# Patient Record
Sex: FEMALE | Race: ASIAN | NOT HISPANIC OR LATINO
[De-identification: names, ages, dates, MRNs, and addresses within clinical notes are randomized per-mention and may not be internally consistent; named-entity substitution may affect disease eponyms.]

---

## 2019-07-18 ENCOUNTER — APPOINTMENT (OUTPATIENT)
Dept: OBGYN | Facility: CLINIC | Age: 44
End: 2019-07-18
Payer: COMMERCIAL

## 2019-07-18 PROCEDURE — 99203 OFFICE O/P NEW LOW 30 MIN: CPT

## 2019-07-25 ENCOUNTER — FORM ENCOUNTER (OUTPATIENT)
Age: 44
End: 2019-07-25

## 2019-09-09 ENCOUNTER — APPOINTMENT (OUTPATIENT)
Dept: INTERNAL MEDICINE | Facility: CLINIC | Age: 44
End: 2019-09-09
Payer: COMMERCIAL

## 2019-09-09 ENCOUNTER — NON-APPOINTMENT (OUTPATIENT)
Age: 44
End: 2019-09-09

## 2019-09-09 VITALS
OXYGEN SATURATION: 98 % | SYSTOLIC BLOOD PRESSURE: 110 MMHG | HEIGHT: 62 IN | WEIGHT: 105 LBS | DIASTOLIC BLOOD PRESSURE: 70 MMHG | BODY MASS INDEX: 19.32 KG/M2 | HEART RATE: 75 BPM | TEMPERATURE: 98.6 F

## 2019-09-09 DIAGNOSIS — Z98.890 OTHER SPECIFIED POSTPROCEDURAL STATES: ICD-10-CM

## 2019-09-09 DIAGNOSIS — Z78.9 OTHER SPECIFIED HEALTH STATUS: ICD-10-CM

## 2019-09-09 DIAGNOSIS — Z80.42 FAMILY HISTORY OF MALIGNANT NEOPLASM OF PROSTATE: ICD-10-CM

## 2019-09-09 DIAGNOSIS — Z82.49 FAMILY HISTORY OF ISCHEMIC HEART DISEASE AND OTHER DISEASES OF THE CIRCULATORY SYSTEM: ICD-10-CM

## 2019-09-09 DIAGNOSIS — Z01.818 ENCOUNTER FOR OTHER PREPROCEDURAL EXAMINATION: ICD-10-CM

## 2019-09-09 DIAGNOSIS — Z87.19 OTHER SPECIFIED POSTPROCEDURAL STATES: ICD-10-CM

## 2019-09-09 DIAGNOSIS — D25.9 LEIOMYOMA OF UTERUS, UNSPECIFIED: ICD-10-CM

## 2019-09-09 DIAGNOSIS — Z80.3 FAMILY HISTORY OF MALIGNANT NEOPLASM OF BREAST: ICD-10-CM

## 2019-09-09 DIAGNOSIS — Z86.018 PERSONAL HISTORY OF OTHER BENIGN NEOPLASM: ICD-10-CM

## 2019-09-09 DIAGNOSIS — Z92.89 PERSONAL HISTORY OF OTHER MEDICAL TREATMENT: ICD-10-CM

## 2019-09-09 PROCEDURE — 93000 ELECTROCARDIOGRAM COMPLETE: CPT

## 2019-09-09 PROCEDURE — 99203 OFFICE O/P NEW LOW 30 MIN: CPT | Mod: 25

## 2019-09-09 PROCEDURE — 36415 COLL VENOUS BLD VENIPUNCTURE: CPT

## 2019-09-10 LAB
ABO + RH PNL BLD: NORMAL
ALBUMIN SERPL ELPH-MCNC: 4.3 G/DL
ALP BLD-CCNC: 41 U/L
ALT SERPL-CCNC: 12 U/L
ANION GAP SERPL CALC-SCNC: 14 MMOL/L
APPEARANCE: CLEAR
APTT BLD: 29.6 SEC
AST SERPL-CCNC: 18 U/L
BASOPHILS # BLD AUTO: 0.03 K/UL
BASOPHILS NFR BLD AUTO: 0.9 %
BILIRUB SERPL-MCNC: 0.2 MG/DL
BILIRUBIN URINE: NEGATIVE
BLOOD URINE: NORMAL
BUN SERPL-MCNC: 12 MG/DL
CALCIUM SERPL-MCNC: 9.7 MG/DL
CHLORIDE SERPL-SCNC: 104 MMOL/L
CO2 SERPL-SCNC: 22 MMOL/L
COLOR: COLORLESS
CREAT SERPL-MCNC: 0.67 MG/DL
EOSINOPHIL # BLD AUTO: 0.02 K/UL
EOSINOPHIL NFR BLD AUTO: 0.6 %
GLUCOSE QUALITATIVE U: NEGATIVE
GLUCOSE SERPL-MCNC: 96 MG/DL
HCT VFR BLD CALC: 43.6 %
HGB BLD-MCNC: 14.3 G/DL
IMM GRANULOCYTES NFR BLD AUTO: 0.3 %
INR PPP: 0.87 RATIO
KETONES URINE: NEGATIVE
LEUKOCYTE ESTERASE URINE: NEGATIVE
LYMPHOCYTES # BLD AUTO: 1.11 K/UL
LYMPHOCYTES NFR BLD AUTO: 31.6 %
MAN DIFF?: NORMAL
MCHC RBC-ENTMCNC: 32.6 PG
MCHC RBC-ENTMCNC: 32.8 GM/DL
MCV RBC AUTO: 99.3 FL
MONOCYTES # BLD AUTO: 0.29 K/UL
MONOCYTES NFR BLD AUTO: 8.3 %
NEUTROPHILS # BLD AUTO: 2.05 K/UL
NEUTROPHILS NFR BLD AUTO: 58.3 %
NITRITE URINE: NEGATIVE
PH URINE: 7
PLATELET # BLD AUTO: 286 K/UL
POTASSIUM SERPL-SCNC: 4.6 MMOL/L
PROT SERPL-MCNC: 7.2 G/DL
PROTEIN URINE: NEGATIVE
PT BLD: 9.9 SEC
RBC # BLD: 4.39 M/UL
RBC # FLD: 13 %
SODIUM SERPL-SCNC: 140 MMOL/L
SPECIFIC GRAVITY URINE: 1.01
UROBILINOGEN URINE: NORMAL
WBC # FLD AUTO: 3.51 K/UL

## 2019-09-10 NOTE — ASSESSMENT
[Patient Optimized for Surgery] : Patient optimized for surgery [FreeTextEntry4] : LOW risk for INTERMEDIATE risk surgery. May proceed w/ elective surgery.  [No Further Testing Recommended] : no further testing recommended

## 2019-09-10 NOTE — HISTORY OF PRESENT ILLNESS
[No Pertinent Cardiac History] : no history of aortic stenosis, atrial fibrillation, coronary artery disease, recent myocardial infarction, or implantable device/pacemaker [No Pertinent Pulmonary History] : no history of asthma, COPD, sleep apnea, or smoking [No Adverse Anesthesia Reaction] : no adverse anesthesia reaction in self or family member [Chronic Kidney Disease] : no chronic kidney disease [Chronic Anticoagulation] : no chronic anticoagulation [Diabetes] : no diabetes [Excellent (>10 METs)] : Excellent (>10 METs) [FreeTextEntry1] : Abdominal myomectomy D and C diagnostic hysteroscopy tubal lavage [FreeTextEntry2] : 9/20/19 [FreeTextEntry3] : Dr. Saavedra [FreeTextEntry4] : Fibroid uterus causing discomfort from mass effects.

## 2019-09-11 LAB — HCG UR QL: NEGATIVE

## 2019-09-19 VITALS
WEIGHT: 106.7 LBS | OXYGEN SATURATION: 100 % | HEART RATE: 73 BPM | DIASTOLIC BLOOD PRESSURE: 62 MMHG | SYSTOLIC BLOOD PRESSURE: 98 MMHG | RESPIRATION RATE: 16 BRPM | HEIGHT: 62 IN | TEMPERATURE: 97 F

## 2019-09-20 ENCOUNTER — RESULT REVIEW (OUTPATIENT)
Age: 44
End: 2019-09-20

## 2019-09-20 ENCOUNTER — APPOINTMENT (OUTPATIENT)
Dept: OBGYN | Facility: HOSPITAL | Age: 44
End: 2019-09-20

## 2019-09-20 ENCOUNTER — INPATIENT (INPATIENT)
Facility: HOSPITAL | Age: 44
LOS: 2 days | Discharge: HOME | DRG: 743 | End: 2019-09-23
Attending: OBSTETRICS & GYNECOLOGY | Admitting: OBSTETRICS & GYNECOLOGY
Payer: COMMERCIAL

## 2019-09-20 DIAGNOSIS — Z41.9 ENCOUNTER FOR PROCEDURE FOR PURPOSES OTHER THAN REMEDYING HEALTH STATE, UNSPECIFIED: Chronic | ICD-10-CM

## 2019-09-20 DIAGNOSIS — Z98.890 OTHER SPECIFIED POSTPROCEDURAL STATES: Chronic | ICD-10-CM

## 2019-09-20 LAB
BLD GP AB SCN SERPL QL: NEGATIVE — SIGNIFICANT CHANGE UP
RH IG SCN BLD-IMP: POSITIVE — SIGNIFICANT CHANGE UP

## 2019-09-20 PROCEDURE — 58140 MYOMECTOMY ABDOM METHOD: CPT

## 2019-09-20 PROCEDURE — 58140 MYOMECTOMY ABDOM METHOD: CPT | Mod: 80

## 2019-09-20 PROCEDURE — 58555 HYSTEROSCOPY DX SEP PROC: CPT

## 2019-09-20 PROCEDURE — 58555 HYSTEROSCOPY DX SEP PROC: CPT | Mod: 80

## 2019-09-20 RX ORDER — METOCLOPRAMIDE HCL 10 MG
10 TABLET ORAL EVERY 6 HOURS
Refills: 0 | Status: DISCONTINUED | OUTPATIENT
Start: 2019-09-20 | End: 2019-09-23

## 2019-09-20 RX ORDER — DOCUSATE SODIUM 100 MG
100 CAPSULE ORAL
Refills: 0 | Status: DISCONTINUED | OUTPATIENT
Start: 2019-09-20 | End: 2019-09-23

## 2019-09-20 RX ORDER — ONDANSETRON 8 MG/1
4 TABLET, FILM COATED ORAL EVERY 6 HOURS
Refills: 0 | Status: DISCONTINUED | OUTPATIENT
Start: 2019-09-20 | End: 2019-09-23

## 2019-09-20 RX ORDER — ACETAMINOPHEN 500 MG
1000 TABLET ORAL EVERY 8 HOURS
Refills: 0 | Status: DISCONTINUED | OUTPATIENT
Start: 2019-09-20 | End: 2019-09-23

## 2019-09-20 RX ORDER — HEPARIN SODIUM 5000 [USP'U]/ML
5000 INJECTION INTRAVENOUS; SUBCUTANEOUS ONCE
Refills: 0 | Status: COMPLETED | OUTPATIENT
Start: 2019-09-20 | End: 2019-09-20

## 2019-09-20 RX ORDER — KETOROLAC TROMETHAMINE 30 MG/ML
30 SYRINGE (ML) INJECTION EVERY 6 HOURS
Refills: 0 | Status: DISCONTINUED | OUTPATIENT
Start: 2019-09-20 | End: 2019-09-20

## 2019-09-20 RX ORDER — ENOXAPARIN SODIUM 100 MG/ML
40 INJECTION SUBCUTANEOUS EVERY 24 HOURS
Refills: 0 | Status: DISCONTINUED | OUTPATIENT
Start: 2019-09-21 | End: 2019-09-23

## 2019-09-20 RX ORDER — SCOPALAMINE 1 MG/3D
1 PATCH, EXTENDED RELEASE TRANSDERMAL ONCE
Refills: 0 | Status: COMPLETED | OUTPATIENT
Start: 2019-09-20 | End: 2019-09-20

## 2019-09-20 RX ORDER — OXYCODONE HYDROCHLORIDE 5 MG/1
10 TABLET ORAL EVERY 6 HOURS
Refills: 0 | Status: DISCONTINUED | OUTPATIENT
Start: 2019-09-20 | End: 2019-09-23

## 2019-09-20 RX ORDER — HYDROMORPHONE HYDROCHLORIDE 2 MG/ML
0.5 INJECTION INTRAMUSCULAR; INTRAVENOUS; SUBCUTANEOUS
Refills: 0 | Status: DISCONTINUED | OUTPATIENT
Start: 2019-09-20 | End: 2019-09-23

## 2019-09-20 RX ORDER — SIMETHICONE 80 MG/1
80 TABLET, CHEWABLE ORAL THREE TIMES A DAY
Refills: 0 | Status: DISCONTINUED | OUTPATIENT
Start: 2019-09-20 | End: 2019-09-23

## 2019-09-20 RX ORDER — OXYCODONE HYDROCHLORIDE 5 MG/1
5 TABLET ORAL EVERY 4 HOURS
Refills: 0 | Status: DISCONTINUED | OUTPATIENT
Start: 2019-09-20 | End: 2019-09-23

## 2019-09-20 RX ORDER — PHENAZOPYRIDINE HCL 100 MG
200 TABLET ORAL EVERY 8 HOURS
Refills: 0 | Status: DISCONTINUED | OUTPATIENT
Start: 2019-09-20 | End: 2019-09-23

## 2019-09-20 RX ORDER — SODIUM CHLORIDE 9 MG/ML
1000 INJECTION, SOLUTION INTRAVENOUS
Refills: 0 | Status: DISCONTINUED | OUTPATIENT
Start: 2019-09-20 | End: 2019-09-21

## 2019-09-20 RX ADMIN — Medication 100 MILLIGRAM(S): at 18:04

## 2019-09-20 RX ADMIN — SCOPALAMINE 1 PATCH: 1 PATCH, EXTENDED RELEASE TRANSDERMAL at 18:05

## 2019-09-20 RX ADMIN — HYDROMORPHONE HYDROCHLORIDE 0.5 MILLIGRAM(S): 2 INJECTION INTRAMUSCULAR; INTRAVENOUS; SUBCUTANEOUS at 18:03

## 2019-09-20 RX ADMIN — HYDROMORPHONE HYDROCHLORIDE 0.5 MILLIGRAM(S): 2 INJECTION INTRAMUSCULAR; INTRAVENOUS; SUBCUTANEOUS at 18:13

## 2019-09-20 RX ADMIN — SODIUM CHLORIDE 90 MILLILITER(S): 9 INJECTION, SOLUTION INTRAVENOUS at 18:08

## 2019-09-20 RX ADMIN — SIMETHICONE 80 MILLIGRAM(S): 80 TABLET, CHEWABLE ORAL at 22:30

## 2019-09-20 RX ADMIN — Medication 30 MILLIGRAM(S): at 22:30

## 2019-09-20 RX ADMIN — Medication 200 MILLIGRAM(S): at 22:31

## 2019-09-20 RX ADMIN — Medication 1000 MILLIGRAM(S): at 23:30

## 2019-09-20 RX ADMIN — Medication 30 MILLIGRAM(S): at 22:55

## 2019-09-20 RX ADMIN — HEPARIN SODIUM 5000 UNIT(S): 5000 INJECTION INTRAVENOUS; SUBCUTANEOUS at 18:03

## 2019-09-20 RX ADMIN — SCOPALAMINE 1 PATCH: 1 PATCH, EXTENDED RELEASE TRANSDERMAL at 18:43

## 2019-09-20 RX ADMIN — Medication 1000 MILLIGRAM(S): at 22:30

## 2019-09-20 NOTE — H&P ADULT - HISTORY OF PRESENT ILLNESS
43yo G0 presenting for scheduled myomectomy with possible hysteroscopy, D&C, tubal lavage. Pt states that she has a 12cm fibroid last imaged in March that has been causing her to experience pelvic pressure, urinary frequency, and bulk symptoms. She denies heavy vaginal bleeding. Pt is actively trying to get pregnant and follows with infertility specialist. Feels well today.     OBHx: denies  GYNHx: LEEP 2012; recent colpo negative; regular menses with normal flow;   PMH: denies  PSH: hx failed hysteroscopy last year c/b cervical stenosis; egg retrieval x1  Meds: OCPs  All: NKDA

## 2019-09-20 NOTE — BRIEF OPERATIVE NOTE - NSICDXBRIEFPROCEDURE_GEN_ALL_CORE_FT
PROCEDURES:  Open left ovarian cystectomy 20-Sep-2019 18:00:59  Kelly Carias  Chromotubation, oviduct 20-Sep-2019 17:58:15  Kelly Carias  Myomectomy, uterus, 5 or more leiomyomata, abdominal approach, total weight greater than 250 grams 20-Sep-2019 17:57:51  Kelly Carias  Hysteroscopy 20-Sep-2019 17:57:37  Kelly Carias

## 2019-09-20 NOTE — BRIEF OPERATIVE NOTE - OPERATION/FINDINGS
Enlarged fibroid uterus to 22 weeks, multiple fibroids  Normal uterine cavity on hysteroscopy   Bilateral efflux of dye from fallopian tubes indicating patency  Normal ovaries with simple appearing right cyst Enlarged fibroid uterus to 22 weeks, multiple fibroids  Normal uterine cavity on hysteroscopy   Bilateral efflux of dye from fallopian tubes indicating patency  Normal ovaries with simple appearing left ovarian cyst

## 2019-09-20 NOTE — H&P ADULT - ASSESSMENT
45yo G0 presenting for scheduled abdominal myomectomy, possible hysteroscopy, D&C and tubal lavage  -NPO  -IVF  -cross 2u PRBC

## 2019-09-20 NOTE — PACU DISCHARGE NOTE - COMMENTS
Patient meets criteria for PACU discharge. A&Ox4, VSS, dressing to abdomen clean, dry and intact, Patient drinking water and eating crackers with no nausea.  at bedside, Patient moving all extremities and shifting herself in bed, No SOB, No chest pain, # 18 gauge IV to left arm intact and patent receiving LRS @ 90ml/hr, #16 F Ballard draining yellow/clear urine>170ml output since 17:47-Now 2105PM, Report given to Claudine on 5 Wol[place], patient transported via bed with 2 CNAs

## 2019-09-21 LAB
BASOPHILS # BLD AUTO: 0.02 K/UL — SIGNIFICANT CHANGE UP (ref 0–0.2)
BASOPHILS NFR BLD AUTO: 0.2 % — SIGNIFICANT CHANGE UP (ref 0–2)
EOSINOPHIL # BLD AUTO: 0 K/UL — SIGNIFICANT CHANGE UP (ref 0–0.5)
EOSINOPHIL NFR BLD AUTO: 0 % — SIGNIFICANT CHANGE UP (ref 0–6)
HCT VFR BLD CALC: 33 % — LOW (ref 34.5–45)
HCT VFR BLD CALC: 33.2 % — LOW (ref 34.5–45)
HGB BLD-MCNC: 11 G/DL — LOW (ref 11.5–15.5)
HGB BLD-MCNC: 11.2 G/DL — LOW (ref 11.5–15.5)
IMM GRANULOCYTES NFR BLD AUTO: 0.5 % — SIGNIFICANT CHANGE UP (ref 0–1.5)
LYMPHOCYTES # BLD AUTO: 0.85 K/UL — LOW (ref 1–3.3)
LYMPHOCYTES # BLD AUTO: 8.5 % — LOW (ref 13–44)
MCHC RBC-ENTMCNC: 32.5 PG — SIGNIFICANT CHANGE UP (ref 27–34)
MCHC RBC-ENTMCNC: 32.6 PG — SIGNIFICANT CHANGE UP (ref 27–34)
MCHC RBC-ENTMCNC: 33.1 GM/DL — SIGNIFICANT CHANGE UP (ref 32–36)
MCHC RBC-ENTMCNC: 33.9 GM/DL — SIGNIFICANT CHANGE UP (ref 32–36)
MCV RBC AUTO: 95.9 FL — SIGNIFICANT CHANGE UP (ref 80–100)
MCV RBC AUTO: 98.2 FL — SIGNIFICANT CHANGE UP (ref 80–100)
MONOCYTES # BLD AUTO: 0.7 K/UL — SIGNIFICANT CHANGE UP (ref 0–0.9)
MONOCYTES NFR BLD AUTO: 7 % — SIGNIFICANT CHANGE UP (ref 2–14)
NEUTROPHILS # BLD AUTO: 8.37 K/UL — HIGH (ref 1.8–7.4)
NEUTROPHILS NFR BLD AUTO: 83.8 % — HIGH (ref 43–77)
NRBC # BLD: 0 /100 WBCS — SIGNIFICANT CHANGE UP (ref 0–0)
NRBC # BLD: 0 /100 WBCS — SIGNIFICANT CHANGE UP (ref 0–0)
PLATELET # BLD AUTO: 196 K/UL — SIGNIFICANT CHANGE UP (ref 150–400)
PLATELET # BLD AUTO: 196 K/UL — SIGNIFICANT CHANGE UP (ref 150–400)
RBC # BLD: 3.38 M/UL — LOW (ref 3.8–5.2)
RBC # BLD: 3.44 M/UL — LOW (ref 3.8–5.2)
RBC # FLD: 12.4 % — SIGNIFICANT CHANGE UP (ref 10.3–14.5)
RBC # FLD: 12.6 % — SIGNIFICANT CHANGE UP (ref 10.3–14.5)
WBC # BLD: 8.86 K/UL — SIGNIFICANT CHANGE UP (ref 3.8–10.5)
WBC # BLD: 9.99 K/UL — SIGNIFICANT CHANGE UP (ref 3.8–10.5)
WBC # FLD AUTO: 8.86 K/UL — SIGNIFICANT CHANGE UP (ref 3.8–10.5)
WBC # FLD AUTO: 9.99 K/UL — SIGNIFICANT CHANGE UP (ref 3.8–10.5)

## 2019-09-21 RX ORDER — SODIUM CHLORIDE 9 MG/ML
1000 INJECTION, SOLUTION INTRAVENOUS
Refills: 0 | Status: DISCONTINUED | OUTPATIENT
Start: 2019-09-21 | End: 2019-09-22

## 2019-09-21 RX ORDER — SODIUM CHLORIDE 9 MG/ML
3 INJECTION INTRAMUSCULAR; INTRAVENOUS; SUBCUTANEOUS EVERY 8 HOURS
Refills: 0 | Status: DISCONTINUED | OUTPATIENT
Start: 2019-09-21 | End: 2019-09-23

## 2019-09-21 RX ORDER — SODIUM CHLORIDE 9 MG/ML
500 INJECTION, SOLUTION INTRAVENOUS
Refills: 0 | Status: COMPLETED | OUTPATIENT
Start: 2019-09-21 | End: 2019-09-21

## 2019-09-21 RX ADMIN — ONDANSETRON 4 MILLIGRAM(S): 8 TABLET, FILM COATED ORAL at 09:30

## 2019-09-21 RX ADMIN — Medication 200 MILLIGRAM(S): at 12:10

## 2019-09-21 RX ADMIN — OXYCODONE HYDROCHLORIDE 5 MILLIGRAM(S): 5 TABLET ORAL at 13:31

## 2019-09-21 RX ADMIN — ENOXAPARIN SODIUM 40 MILLIGRAM(S): 100 INJECTION SUBCUTANEOUS at 05:36

## 2019-09-21 RX ADMIN — Medication 500 MILLIGRAM(S): at 21:20

## 2019-09-21 RX ADMIN — SCOPALAMINE 1 PATCH: 1 PATCH, EXTENDED RELEASE TRANSDERMAL at 05:37

## 2019-09-21 RX ADMIN — OXYCODONE HYDROCHLORIDE 5 MILLIGRAM(S): 5 TABLET ORAL at 00:11

## 2019-09-21 RX ADMIN — OXYCODONE HYDROCHLORIDE 5 MILLIGRAM(S): 5 TABLET ORAL at 01:00

## 2019-09-21 RX ADMIN — SIMETHICONE 80 MILLIGRAM(S): 80 TABLET, CHEWABLE ORAL at 05:36

## 2019-09-21 RX ADMIN — OXYCODONE HYDROCHLORIDE 5 MILLIGRAM(S): 5 TABLET ORAL at 14:37

## 2019-09-21 RX ADMIN — OXYCODONE HYDROCHLORIDE 5 MILLIGRAM(S): 5 TABLET ORAL at 18:17

## 2019-09-21 RX ADMIN — Medication 1000 MILLIGRAM(S): at 22:04

## 2019-09-21 RX ADMIN — Medication 200 MILLIGRAM(S): at 05:36

## 2019-09-21 RX ADMIN — Medication 1000 MILLIGRAM(S): at 13:49

## 2019-09-21 RX ADMIN — OXYCODONE HYDROCHLORIDE 10 MILLIGRAM(S): 5 TABLET ORAL at 04:15

## 2019-09-21 RX ADMIN — OXYCODONE HYDROCHLORIDE 10 MILLIGRAM(S): 5 TABLET ORAL at 22:49

## 2019-09-21 RX ADMIN — SIMETHICONE 80 MILLIGRAM(S): 80 TABLET, CHEWABLE ORAL at 12:10

## 2019-09-21 RX ADMIN — SODIUM CHLORIDE 3 MILLILITER(S): 9 INJECTION INTRAMUSCULAR; INTRAVENOUS; SUBCUTANEOUS at 22:05

## 2019-09-21 RX ADMIN — Medication 1000 MILLIGRAM(S): at 12:10

## 2019-09-21 RX ADMIN — SODIUM CHLORIDE 90 MILLILITER(S): 9 INJECTION, SOLUTION INTRAVENOUS at 16:36

## 2019-09-21 RX ADMIN — Medication 100 MILLIGRAM(S): at 05:36

## 2019-09-21 RX ADMIN — SODIUM CHLORIDE 1000 MILLILITER(S): 9 INJECTION, SOLUTION INTRAVENOUS at 09:34

## 2019-09-21 RX ADMIN — OXYCODONE HYDROCHLORIDE 5 MILLIGRAM(S): 5 TABLET ORAL at 20:01

## 2019-09-21 RX ADMIN — OXYCODONE HYDROCHLORIDE 10 MILLIGRAM(S): 5 TABLET ORAL at 05:02

## 2019-09-21 RX ADMIN — Medication 500 MILLIGRAM(S): at 09:30

## 2019-09-21 RX ADMIN — Medication 1000 MILLIGRAM(S): at 23:00

## 2019-09-21 RX ADMIN — OXYCODONE HYDROCHLORIDE 10 MILLIGRAM(S): 5 TABLET ORAL at 23:35

## 2019-09-21 RX ADMIN — Medication 200 MILLIGRAM(S): at 22:04

## 2019-09-21 RX ADMIN — Medication 1000 MILLIGRAM(S): at 06:20

## 2019-09-21 RX ADMIN — Medication 500 MILLIGRAM(S): at 10:20

## 2019-09-21 RX ADMIN — SIMETHICONE 80 MILLIGRAM(S): 80 TABLET, CHEWABLE ORAL at 22:04

## 2019-09-21 RX ADMIN — SODIUM CHLORIDE 3 MILLILITER(S): 9 INJECTION INTRAMUSCULAR; INTRAVENOUS; SUBCUTANEOUS at 12:04

## 2019-09-21 RX ADMIN — SCOPALAMINE 1 PATCH: 1 PATCH, EXTENDED RELEASE TRANSDERMAL at 18:18

## 2019-09-21 RX ADMIN — Medication 1000 MILLIGRAM(S): at 05:36

## 2019-09-21 RX ADMIN — Medication 100 MILLIGRAM(S): at 18:17

## 2019-09-21 RX ADMIN — Medication 500 MILLIGRAM(S): at 20:32

## 2019-09-21 NOTE — PROGRESS NOTE ADULT - ASSESSMENT
A/P: 45yo s/p abdominal myomectomy, diagnostic hysteroscopy, and chromopertubation for a 22 week size uterus, now POD1  1. Neuro/Pain:  torodal, tylenol,  2  CV:   VS per routine, Heplock this AM , hemodynamically stable  3. Pulm: Encourage ISS  4. GI: Reg, no nausea/vomiting, passing flatus  5. :  ashby to be removed this am  6. Heme: f/u am labs  7. ID: --  8. DVT ppx: SCDs  9. Dispo: when meeting all post-op milestones

## 2019-09-21 NOTE — PROGRESS NOTE ADULT - SUBJECTIVE AND OBJECTIVE BOX
GYN Progress Note    Patient seen at bedside. Pain well controlled. Tolerating clears. Ambulating, passing flatus. Ballard to be removed this morning. Denies CP, palpitations, SOB, fever, chills, nausea, vomiting.  Denies CP, palpitations, SOB, fever, chills, nausea, vomiting.    Vital Signs Last 24 Hrs  T(C): 37.6 (21 Sep 2019 05:59), Max: 37.6 (21 Sep 2019 01:14)  T(F): 99.6 (21 Sep 2019 05:59), Max: 99.6 (21 Sep 2019 01:14)  HR: 74 (21 Sep 2019 05:59) (68 - 82)  BP: 92/54 (21 Sep 2019 05:59) (92/54 - 109/67)  BP(mean): 72 (20 Sep 2019 20:51) (65 - 80)  RR: 18 (21 Sep 2019 05:59) (14 - 18)  SpO2: 95% (21 Sep 2019 05:59) (95% - 100%)    Physical Exam:  Gen: No Acute Distress  Pulm: Normal work of breathing  GI: soft, appropriately tender, nondistended, +BS, no rebound, no guarding.  Incision C/D/I  Ext: SCDs in place, wnl    I&O's Summary    20 Sep 2019 07:01  -  21 Sep 2019 06:14  --------------------------------------------------------  IN: 990 mL / OUT: 2120 mL / NET: -1130 mL      MEDICATIONS  (STANDING):  acetaminophen   Tablet .. 1000 milliGRAM(s) Oral every 8 hours  docusate sodium 100 milliGRAM(s) Oral two times a day  enoxaparin Injectable 40 milliGRAM(s) SubCutaneous every 24 hours  lactated ringers. 1000 milliLiter(s) (90 mL/Hr) IV Continuous <Continuous>  naproxen 500 milliGRAM(s) Oral every 12 hours  phenazopyridine 200 milliGRAM(s) Oral every 8 hours  simethicone 80 milliGRAM(s) Chew three times a day    MEDICATIONS  (PRN):  HYDROmorphone  Injectable 0.5 milliGRAM(s) IV Push every 10 minutes PRN Severe Pain (7 - 10)  metoclopramide Injectable 10 milliGRAM(s) IV Push every 6 hours PRN Nausea and/or Vomiting first line  ondansetron Injectable 4 milliGRAM(s) IV Push every 6 hours PRN Nausea and/or Vomiting  oxyCODONE    IR 5 milliGRAM(s) Oral every 4 hours PRN Moderate Pain (4 - 6)  oxyCODONE    IR 10 milliGRAM(s) Oral every 6 hours PRN Severe Pain (7 - 10)      LABS:

## 2019-09-21 NOTE — PROGRESS NOTE ADULT - ASSESSMENT
A/P: 43yo s/p abdominal myomectomy, diagnostic hysteroscopy, and chromopertubation for a 22 week size uterus, now POD1  1. Neuro/Pain:  torodal, tylenol  2  CV:   VS per routine, continue fluids @ 90 cc/hr until patient eating regularly   3. Pulm: Encourage ISS  4. GI: Reg, no nausea/vomiting, passing flatus  5. :  voiding  6. Heme: ordering stat cbc now  7. ID: --  8. DVT ppx: SCDs  9. Dispo: when meeting all post-op milestones

## 2019-09-21 NOTE — PROGRESS NOTE ADULT - SUBJECTIVE AND OBJECTIVE BOX
GYN POC    Pt seen and examined at bedside. Pt complains of mild abdominal pain, likely due to gas. Just got oxycodone for pain, will try to ambulate now.  Pt denies any fever, chills, chest pain, SOB, nausea or vomiting     T(F): 99.6 (09-21-19 @ 01:14), Max: 99.6 (09-21-19 @ 01:14)  HR: 82 (09-21-19 @ 01:14) (68 - 82)  BP: 97/47 (09-21-19 @ 01:14) (95/55 - 109/67)  RR: 18 (09-21-19 @ 01:14) (14 - 18)  SpO2: 97% (09-21-19 @ 01:14) (95% - 100%)  Wt(kg): --    09-20 @ 07:01  -  09-21 @ 02:10  --------------------------------------------------------  IN: 540 mL / OUT: 1320 mL / NET: -780 mL        acetaminophen   Tablet .. 1000 milliGRAM(s) Oral every 8 hours  docusate sodium 100 milliGRAM(s) Oral two times a day  enoxaparin Injectable 40 milliGRAM(s) SubCutaneous every 24 hours  HYDROmorphone  Injectable 0.5 milliGRAM(s) IV Push every 10 minutes PRN Severe Pain (7 - 10)  lactated ringers. 1000 milliLiter(s) IV Continuous <Continuous>  metoclopramide Injectable 10 milliGRAM(s) IV Push every 6 hours PRN Nausea and/or Vomiting first line  naproxen 500 milliGRAM(s) Oral every 12 hours  ondansetron Injectable 4 milliGRAM(s) IV Push every 6 hours PRN Nausea and/or Vomiting  oxyCODONE    IR 5 milliGRAM(s) Oral every 4 hours PRN Moderate Pain (4 - 6)  oxyCODONE    IR 10 milliGRAM(s) Oral every 6 hours PRN Severe Pain (7 - 10)  phenazopyridine 200 milliGRAM(s) Oral every 8 hours  simethicone 80 milliGRAM(s) Chew three times a day      Physical exam:  Constitutional: NAD  Pulmonary: normal respiratory effort  Abdomen: pressure dressing in place  Extremities: no lower extremity edema, or calve tenderness. SCDs in place

## 2019-09-21 NOTE — PROGRESS NOTE ADULT - ASSESSMENT
A: 43yo s/p abdominal myomectomy, diagnostic hysteroscopy, and chromopertubation for a 22 week size uterus, now POD1  Plan:  1. Vital signs stable, continue to monitor per protocol  2. Pain control: tylenol/toradol atc  3. DVT prophylaxis: SCDs  4. CV: IVH   5. Pulm: Incentive spirometer (at least 10 times per hour while awake)   6. GI: reg  7. : Ballard   8. Follow up labs: AM CBC  9. Activity: bedrest tonight

## 2019-09-21 NOTE — PROGRESS NOTE ADULT - SUBJECTIVE AND OBJECTIVE BOX
Patient evaluated at bedside. She has no complaints. She reports pain is well controlled with medications. She denies HA, dizziness, SOB, CP, palpitations, n/v. Says dizziness has resolved, has gotten up out of bed multiple times to urinate and doesn't feel lightheaded or dizzy.     T(C): 37.6 (09-21-19 @ 05:59), Max: 37.6 (09-21-19 @ 05:59)  HR: 78 (09-21-19 @ 13:30) (56 - 82)  BP: 93/53 (09-21-19 @ 13:30) (64/40 - 96/91)  RR: 18 (09-21-19 @ 13:30) (17 - 19)  SpO2: 95% (09-21-19 @ 13:30) (95% - 98%)    GA:  Resp: normal respiratory effort  Abd: +BS, soft, NT/ND, no rebound or guarding, uterus firm at umbilicus, higher than this AM's exam, but fundus firm and rest of abdomen soft   Extrem: SCDs in place, no LE edema       09-20 @ 07:01  -  09-21 @ 07:00  --------------------------------------------------------  IN: 990 mL / OUT: 2120 mL / NET: -1130 mL    09-21 @ 07:01  -  09-21 @ 15:59  --------------------------------------------------------  IN: 0 mL / OUT: 2200 mL / NET: -2200 mL                              11.2   9.99  )-----------( 196      ( 21 Sep 2019 06:07 )             33.0

## 2019-09-21 NOTE — PROGRESS NOTE ADULT - ATTENDING COMMENTS
Pt doing well abd soft appropriately tender and soft mildly distended. Ambulating well without symptoms. voiding spont. tolerating regular diet.  To repeat cbc in am

## 2019-09-22 ENCOUNTER — TRANSCRIPTION ENCOUNTER (OUTPATIENT)
Age: 44
End: 2019-09-22

## 2019-09-22 LAB
HCT VFR BLD CALC: 32.1 % — LOW (ref 34.5–45)
HGB BLD-MCNC: 10.3 G/DL — LOW (ref 11.5–15.5)
MCHC RBC-ENTMCNC: 32 PG — SIGNIFICANT CHANGE UP (ref 27–34)
MCHC RBC-ENTMCNC: 32.1 GM/DL — SIGNIFICANT CHANGE UP (ref 32–36)
MCV RBC AUTO: 99.7 FL — SIGNIFICANT CHANGE UP (ref 80–100)
NRBC # BLD: 0 /100 WBCS — SIGNIFICANT CHANGE UP (ref 0–0)
PLATELET # BLD AUTO: 177 K/UL — SIGNIFICANT CHANGE UP (ref 150–400)
RBC # BLD: 3.22 M/UL — LOW (ref 3.8–5.2)
RBC # FLD: 12.6 % — SIGNIFICANT CHANGE UP (ref 10.3–14.5)
WBC # BLD: 7.63 K/UL — SIGNIFICANT CHANGE UP (ref 3.8–10.5)
WBC # FLD AUTO: 7.63 K/UL — SIGNIFICANT CHANGE UP (ref 3.8–10.5)

## 2019-09-22 RX ORDER — OXYCODONE HYDROCHLORIDE 5 MG/1
1 TABLET ORAL
Qty: 8 | Refills: 0
Start: 2019-09-22 | End: 2019-09-23

## 2019-09-22 RX ADMIN — Medication 200 MILLIGRAM(S): at 14:00

## 2019-09-22 RX ADMIN — SIMETHICONE 80 MILLIGRAM(S): 80 TABLET, CHEWABLE ORAL at 21:04

## 2019-09-22 RX ADMIN — Medication 1000 MILLIGRAM(S): at 05:40

## 2019-09-22 RX ADMIN — Medication 100 MILLIGRAM(S): at 05:40

## 2019-09-22 RX ADMIN — Medication 1000 MILLIGRAM(S): at 21:05

## 2019-09-22 RX ADMIN — Medication 1000 MILLIGRAM(S): at 15:11

## 2019-09-22 RX ADMIN — Medication 200 MILLIGRAM(S): at 21:04

## 2019-09-22 RX ADMIN — SIMETHICONE 80 MILLIGRAM(S): 80 TABLET, CHEWABLE ORAL at 14:01

## 2019-09-22 RX ADMIN — Medication 1000 MILLIGRAM(S): at 22:05

## 2019-09-22 RX ADMIN — Medication 1000 MILLIGRAM(S): at 06:30

## 2019-09-22 RX ADMIN — Medication 100 MILLIGRAM(S): at 18:47

## 2019-09-22 RX ADMIN — SODIUM CHLORIDE 3 MILLILITER(S): 9 INJECTION INTRAMUSCULAR; INTRAVENOUS; SUBCUTANEOUS at 05:46

## 2019-09-22 RX ADMIN — SODIUM CHLORIDE 3 MILLILITER(S): 9 INJECTION INTRAMUSCULAR; INTRAVENOUS; SUBCUTANEOUS at 14:01

## 2019-09-22 RX ADMIN — Medication 200 MILLIGRAM(S): at 05:39

## 2019-09-22 RX ADMIN — OXYCODONE HYDROCHLORIDE 5 MILLIGRAM(S): 5 TABLET ORAL at 06:34

## 2019-09-22 RX ADMIN — OXYCODONE HYDROCHLORIDE 5 MILLIGRAM(S): 5 TABLET ORAL at 18:48

## 2019-09-22 RX ADMIN — SODIUM CHLORIDE 3 MILLILITER(S): 9 INJECTION INTRAMUSCULAR; INTRAVENOUS; SUBCUTANEOUS at 21:07

## 2019-09-22 RX ADMIN — SCOPALAMINE 1 PATCH: 1 PATCH, EXTENDED RELEASE TRANSDERMAL at 18:58

## 2019-09-22 RX ADMIN — OXYCODONE HYDROCHLORIDE 5 MILLIGRAM(S): 5 TABLET ORAL at 07:20

## 2019-09-22 RX ADMIN — Medication 500 MILLIGRAM(S): at 11:10

## 2019-09-22 RX ADMIN — Medication 500 MILLIGRAM(S): at 22:05

## 2019-09-22 RX ADMIN — SCOPALAMINE 1 PATCH: 1 PATCH, EXTENDED RELEASE TRANSDERMAL at 06:34

## 2019-09-22 RX ADMIN — ONDANSETRON 4 MILLIGRAM(S): 8 TABLET, FILM COATED ORAL at 14:00

## 2019-09-22 RX ADMIN — OXYCODONE HYDROCHLORIDE 5 MILLIGRAM(S): 5 TABLET ORAL at 20:00

## 2019-09-22 RX ADMIN — Medication 500 MILLIGRAM(S): at 12:00

## 2019-09-22 RX ADMIN — ENOXAPARIN SODIUM 40 MILLIGRAM(S): 100 INJECTION SUBCUTANEOUS at 05:38

## 2019-09-22 RX ADMIN — Medication 500 MILLIGRAM(S): at 21:05

## 2019-09-22 RX ADMIN — SIMETHICONE 80 MILLIGRAM(S): 80 TABLET, CHEWABLE ORAL at 05:40

## 2019-09-22 RX ADMIN — Medication 1000 MILLIGRAM(S): at 14:00

## 2019-09-22 NOTE — DISCHARGE NOTE PROVIDER - HOSPITAL COURSE
43yo G0 s/p abdominal myomectomy, diagnostic hysteroscopy, and chromopertubation for a fibroids/ 22 week size uterus. Her post-operative course was complicated by a presyncopal episode on POD1 after getting out of bed, likely due to a vasovagal episode which resolved with fluids. Post-op Hb has been stable 11.2>11.0>10.3. She is hemodynamically and clinically stable and meeting all post-operative milestones on POD2. She should follow up with her Gyn in 1-2 weeks.

## 2019-09-22 NOTE — DISCHARGE NOTE PROVIDER - CARE PROVIDER_API CALL
Sandro Saavedra)  Obstetrics and Gynecology  [address] [place] [address], [address]  [place] [address]  Phone: [phone]  Fax: [phone]  Follow Up Time:

## 2019-09-22 NOTE — PROGRESS NOTE ADULT - SUBJECTIVE AND OBJECTIVE BOX
Patient evaluated at bedside. She has no complaints. She reports pain is well controlled with medications. She denies HA, dizziness, SOB, CP, palpitations, n/v. Denies any episodes of dizziness since yesterday morning. Is now eating and drinking. Ambulated multiple times yesterday with no issue.     T(C): 36.9 (09-22-19 @ 05:31), Max: 37.2 (09-21-19 @ 22:04)  HR: 78 (09-22-19 @ 05:31) (68 - 78)  BP: 96/60 (09-22-19 @ 05:31) (96/60 - 102/67)  RR: 18 (09-22-19 @ 05:31) (18 - 18)  SpO2: 96% (09-22-19 @ 05:31) (96% - 96%)    GA:  Resp: normal respiratory effort  Abd: +BS, soft, NT/ND, no rebound or guarding, incisions CDI   Extrem: SCDs in place, no LE edema       09-21 @ 07:01  -  09-22 @ 07:00  --------------------------------------------------------  IN: 1850 mL / OUT: 4950 mL / NET: -3100 mL                              10.3   7.63  )-----------( 177      ( 22 Sep 2019 05:52 )             32.1

## 2019-09-22 NOTE — PROGRESS NOTE ADULT - ASSESSMENT
A/P: 43yo POD#2  s/p abdominal myomectomy, diagnostic hysteroscopy, and chromopertubation for a 22 week size uterus    1. Neuro/Pain:  torodal, tylenol  2  CV:   VS per routine  3. Pulm: Encourage ISS  4. GI: Reg, no nausea/vomiting, passing flatus  5. :  voiding  6. Heme: hgb stable   7. ID: --  8. DVT ppx: SCDs  9. Dispo: when meeting all post-op milestones

## 2019-09-22 NOTE — PROGRESS NOTE ADULT - ATTENDING COMMENTS
Pt seen with complaints of mild nausea no dizziness, +flatus and tolerating po.  VSS  cbc stable  Abd soft nt nd +bs incision clean and dry   ext nt neg homans  S/P abdominal myomectomy stable  repeat cbc in am prob d/c in am

## 2019-09-22 NOTE — DISCHARGE NOTE NURSING/CASE MANAGEMENT/SOCIAL WORK - PATIENT PORTAL LINK FT
You can access the FollowMyHealth Patient Portal offered by [facility] by registering at the following website: [email or web address]. By joining [employer]’s FollowMyHealth portal, you will also be able to view your health information using other applications (apps) compatible with our system.

## 2019-09-22 NOTE — DISCHARGE NOTE PROVIDER - NSDCFUSCHEDAPPT_GEN_ALL_CORE_FT
IMMANUEL MCALLISTER ; 10/03/2019 ; NPP OB/GYN  [address] [facility] IMMANUEL MCALLISTER ; 10/03/2019 ; NPP OB/GYN  [facility]

## 2019-09-22 NOTE — PROGRESS NOTE ADULT - SUBJECTIVE AND OBJECTIVE BOX
Patient evaluated at bedside. She had some nausea earlier which improved after a nap this afternoon and is feeling somewhat weak. She denies any further presyncopal or syncopal episodes. She will try to get up and walk now and eat some lunch. She reports pain is well controlled with medications. She denies HA, dizziness, SOB, CP, palpitations, n/v.    T(C): 36.7 (09-22-19 @ 11:55), Max: 36.9 (09-22-19 @ 05:31)  HR: 61 (09-22-19 @ 11:55) (61 - 78)  BP: 96/57 (09-22-19 @ 11:55) (96/57 - 96/60)  RR: 16 (09-22-19 @ 11:55) (16 - 18)  SpO2: 96% (09-22-19 @ 11:55) (96% - 96%)    GA:  Resp: normal respiratory effort  Abd: +BS, soft, NT/ND, no rebound or guarding  Extrem: SCDs in place, no LE edema       09-21 @ 07:01  -  09-22 @ 07:00  --------------------------------------------------------  IN: 1850 mL / OUT: 4950 mL / NET: -3100 mL    09-22 @ 07:01  -  09-22 @ 15:15  --------------------------------------------------------  IN: 0 mL / OUT: 500 mL / NET: -500 mL                              10.3   7.63  )-----------( 177      ( 22 Sep 2019 05:52 )             32.1

## 2019-09-23 VITALS
RESPIRATION RATE: 17 BRPM | HEART RATE: 71 BPM | DIASTOLIC BLOOD PRESSURE: 61 MMHG | OXYGEN SATURATION: 95 % | TEMPERATURE: 98 F | SYSTOLIC BLOOD PRESSURE: 95 MMHG

## 2019-09-23 LAB
HCT VFR BLD CALC: 34 % — LOW (ref 34.5–45)
HGB BLD-MCNC: 10.8 G/DL — LOW (ref 11.5–15.5)
MCHC RBC-ENTMCNC: 31.8 GM/DL — LOW (ref 32–36)
MCHC RBC-ENTMCNC: 32.1 PG — SIGNIFICANT CHANGE UP (ref 27–34)
MCV RBC AUTO: 101.2 FL — HIGH (ref 80–100)
NRBC # BLD: 0 /100 WBCS — SIGNIFICANT CHANGE UP (ref 0–0)
PLATELET # BLD AUTO: 197 K/UL — SIGNIFICANT CHANGE UP (ref 150–400)
RBC # BLD: 3.36 M/UL — LOW (ref 3.8–5.2)
RBC # FLD: 12.6 % — SIGNIFICANT CHANGE UP (ref 10.3–14.5)
WBC # BLD: 7.9 K/UL — SIGNIFICANT CHANGE UP (ref 3.8–10.5)
WBC # FLD AUTO: 7.9 K/UL — SIGNIFICANT CHANGE UP (ref 3.8–10.5)

## 2019-09-23 RX ORDER — METOCLOPRAMIDE HCL 10 MG
10 TABLET ORAL ONCE
Refills: 0 | Status: COMPLETED | OUTPATIENT
Start: 2019-09-23 | End: 2019-09-23

## 2019-09-23 RX ORDER — OXYCODONE HYDROCHLORIDE 5 MG/1
1 TABLET ORAL
Qty: 8 | Refills: 0
Start: 2019-09-23 | End: 2019-09-24

## 2019-09-23 RX ADMIN — OXYCODONE HYDROCHLORIDE 5 MILLIGRAM(S): 5 TABLET ORAL at 09:29

## 2019-09-23 RX ADMIN — SCOPALAMINE 1 PATCH: 1 PATCH, EXTENDED RELEASE TRANSDERMAL at 06:08

## 2019-09-23 RX ADMIN — Medication 100 MILLIGRAM(S): at 05:26

## 2019-09-23 RX ADMIN — Medication 500 MILLIGRAM(S): at 10:30

## 2019-09-23 RX ADMIN — OXYCODONE HYDROCHLORIDE 5 MILLIGRAM(S): 5 TABLET ORAL at 10:28

## 2019-09-23 RX ADMIN — SODIUM CHLORIDE 3 MILLILITER(S): 9 INJECTION INTRAMUSCULAR; INTRAVENOUS; SUBCUTANEOUS at 05:24

## 2019-09-23 RX ADMIN — ENOXAPARIN SODIUM 40 MILLIGRAM(S): 100 INJECTION SUBCUTANEOUS at 05:26

## 2019-09-23 RX ADMIN — Medication 10 MILLIGRAM(S): at 11:26

## 2019-09-23 RX ADMIN — Medication 1000 MILLIGRAM(S): at 05:26

## 2019-09-23 RX ADMIN — Medication 1000 MILLIGRAM(S): at 06:26

## 2019-09-23 RX ADMIN — SIMETHICONE 80 MILLIGRAM(S): 80 TABLET, CHEWABLE ORAL at 05:26

## 2019-09-23 RX ADMIN — Medication 200 MILLIGRAM(S): at 05:26

## 2019-09-23 RX ADMIN — Medication 500 MILLIGRAM(S): at 09:30

## 2019-09-27 LAB — SURGICAL PATHOLOGY STUDY: SIGNIFICANT CHANGE UP

## 2019-10-02 DIAGNOSIS — D25.9 LEIOMYOMA OF UTERUS, UNSPECIFIED: ICD-10-CM

## 2019-10-02 DIAGNOSIS — N83.209 UNSPECIFIED OVARIAN CYST, UNSPECIFIED SIDE: ICD-10-CM

## 2019-10-03 ENCOUNTER — APPOINTMENT (OUTPATIENT)
Dept: OBGYN | Facility: CLINIC | Age: 44
End: 2019-10-03
Payer: COMMERCIAL

## 2019-10-03 PROBLEM — D25.9 LEIOMYOMA OF UTERUS, UNSPECIFIED: Chronic | Status: ACTIVE | Noted: 2019-09-19

## 2019-10-03 PROCEDURE — 99024 POSTOP FOLLOW-UP VISIT: CPT

## 2019-10-07 ENCOUNTER — FORM ENCOUNTER (OUTPATIENT)
Age: 44
End: 2019-10-07

## 2019-10-23 ENCOUNTER — FORM ENCOUNTER (OUTPATIENT)
Age: 44
End: 2019-10-23

## 2019-10-24 ENCOUNTER — FORM ENCOUNTER (OUTPATIENT)
Age: 44
End: 2019-10-24

## 2019-10-24 ENCOUNTER — APPOINTMENT (OUTPATIENT)
Dept: OBGYN | Facility: CLINIC | Age: 44
End: 2019-10-24
Payer: COMMERCIAL

## 2019-10-24 PROCEDURE — 99024 POSTOP FOLLOW-UP VISIT: CPT

## 2019-10-31 PROCEDURE — 85025 COMPLETE CBC W/AUTO DIFF WBC: CPT

## 2019-10-31 PROCEDURE — 86900 BLOOD TYPING SEROLOGIC ABO: CPT

## 2019-10-31 PROCEDURE — 36415 COLL VENOUS BLD VENIPUNCTURE: CPT

## 2019-10-31 PROCEDURE — 85027 COMPLETE CBC AUTOMATED: CPT

## 2019-10-31 PROCEDURE — 88305 TISSUE EXAM BY PATHOLOGIST: CPT

## 2019-10-31 PROCEDURE — 86901 BLOOD TYPING SEROLOGIC RH(D): CPT

## 2019-10-31 PROCEDURE — 86850 RBC ANTIBODY SCREEN: CPT

## 2021-08-12 ENCOUNTER — NON-APPOINTMENT (OUTPATIENT)
Age: 46
End: 2021-08-12

## 2021-08-13 ENCOUNTER — FORM ENCOUNTER (OUTPATIENT)
Age: 46
End: 2021-08-13

## 2021-08-14 ENCOUNTER — APPOINTMENT (OUTPATIENT)
Dept: OBGYN | Facility: CLINIC | Age: 46
End: 2021-08-14
Payer: MEDICAID

## 2021-08-14 ENCOUNTER — APPOINTMENT (OUTPATIENT)
Dept: OBGYN | Facility: CLINIC | Age: 46
End: 2021-08-14

## 2021-08-14 PROCEDURE — 99213 OFFICE O/P EST LOW 20 MIN: CPT | Mod: 95

## 2021-09-21 ENCOUNTER — FORM ENCOUNTER (OUTPATIENT)
Age: 46
End: 2021-09-21

## 2021-09-29 ENCOUNTER — OUTPATIENT (OUTPATIENT)
Dept: OUTPATIENT SERVICES | Facility: HOSPITAL | Age: 46
LOS: 1 days | End: 2021-09-29
Payer: COMMERCIAL

## 2021-09-29 VITALS
WEIGHT: 110.01 LBS | HEIGHT: 62 IN | DIASTOLIC BLOOD PRESSURE: 69 MMHG | OXYGEN SATURATION: 99 % | RESPIRATION RATE: 16 BRPM | SYSTOLIC BLOOD PRESSURE: 101 MMHG | HEART RATE: 63 BPM | TEMPERATURE: 98 F

## 2021-09-29 DIAGNOSIS — Z01.818 ENCOUNTER FOR OTHER PREPROCEDURAL EXAMINATION: ICD-10-CM

## 2021-09-29 DIAGNOSIS — Z98.890 OTHER SPECIFIED POSTPROCEDURAL STATES: Chronic | ICD-10-CM

## 2021-09-29 DIAGNOSIS — D25.1 INTRAMURAL LEIOMYOMA OF UTERUS: ICD-10-CM

## 2021-09-29 DIAGNOSIS — N84.0 POLYP OF CORPUS UTERI: ICD-10-CM

## 2021-09-29 DIAGNOSIS — Z41.9 ENCOUNTER FOR PROCEDURE FOR PURPOSES OTHER THAN REMEDYING HEALTH STATE, UNSPECIFIED: Chronic | ICD-10-CM

## 2021-09-29 DIAGNOSIS — D25.9 LEIOMYOMA OF UTERUS, UNSPECIFIED: ICD-10-CM

## 2021-09-29 LAB
BLD GP AB SCN SERPL QL: NEGATIVE — SIGNIFICANT CHANGE UP
HCT VFR BLD CALC: 38.5 % — SIGNIFICANT CHANGE UP (ref 34.5–45)
HGB BLD-MCNC: 13 G/DL — SIGNIFICANT CHANGE UP (ref 11.5–15.5)
MCHC RBC-ENTMCNC: 30.7 PG — SIGNIFICANT CHANGE UP (ref 27–34)
MCHC RBC-ENTMCNC: 33.8 GM/DL — SIGNIFICANT CHANGE UP (ref 32–36)
MCV RBC AUTO: 91 FL — SIGNIFICANT CHANGE UP (ref 80–100)
NRBC # BLD: 0 /100 WBCS — SIGNIFICANT CHANGE UP (ref 0–0)
PLATELET # BLD AUTO: 231 K/UL — SIGNIFICANT CHANGE UP (ref 150–400)
RBC # BLD: 4.23 M/UL — SIGNIFICANT CHANGE UP (ref 3.8–5.2)
RBC # FLD: 13.2 % — SIGNIFICANT CHANGE UP (ref 10.3–14.5)
RH IG SCN BLD-IMP: POSITIVE — SIGNIFICANT CHANGE UP
WBC # BLD: 4.2 K/UL — SIGNIFICANT CHANGE UP (ref 3.8–10.5)
WBC # FLD AUTO: 4.2 K/UL — SIGNIFICANT CHANGE UP (ref 3.8–10.5)

## 2021-09-29 PROCEDURE — 86850 RBC ANTIBODY SCREEN: CPT

## 2021-09-29 PROCEDURE — 85027 COMPLETE CBC AUTOMATED: CPT

## 2021-09-29 PROCEDURE — 86900 BLOOD TYPING SEROLOGIC ABO: CPT

## 2021-09-29 PROCEDURE — G0463: CPT

## 2021-09-29 PROCEDURE — 86901 BLOOD TYPING SEROLOGIC RH(D): CPT

## 2021-09-29 RX ORDER — LIDOCAINE HCL 20 MG/ML
0.2 VIAL (ML) INJECTION ONCE
Refills: 0 | Status: DISCONTINUED | OUTPATIENT
Start: 2021-10-01 | End: 2021-10-15

## 2021-09-29 RX ORDER — SODIUM CHLORIDE 9 MG/ML
3 INJECTION INTRAMUSCULAR; INTRAVENOUS; SUBCUTANEOUS EVERY 8 HOURS
Refills: 0 | Status: DISCONTINUED | OUTPATIENT
Start: 2021-10-01 | End: 2021-10-15

## 2021-09-29 NOTE — H&P PST ADULT - NSICDXPASTMEDICALHX_GEN_ALL_CORE_FT
PAST MEDICAL HISTORY:  Uterine fibroid      PAST MEDICAL HISTORY:  Uterine fibroid     Uterine leiomyoma

## 2021-09-29 NOTE — H&P PST ADULT - HISTORY OF PRESENT ILLNESS
IVF transfer,   LMP 21, , myomectomy , 2 miscarriage down syndrome d&C 2020. normal flow, and regular    45yo G0 presenting for scheduled myomectomy with possible hysteroscopy, D&C, tubal lavage. Pt states that she has a 12cm fibroid last imaged in March that has been causing her to experience pelvic pressure, urinary frequency, and bulk symptoms. She denies heavy vaginal bleeding. Pt is actively trying to get pregnant and follows with infertility specialist. Feels well today.     OBHx: denies  GYNHx: LEEP ; recent colpo negative; regular menses with normal flow;   PMH: denies  PSH: hx failed hysteroscopy last year c/b cervical stenosis; egg retrieval x1  Meds: OCPs  All: NKDA     45 yo female, LMP 21, , PMH abnormal PAP s/p  LEEP and normal Colpo, uterine fibroid s/p myomectomy 2019, pt. has regular menses with normal flow - getting prepared for IVF. U/S revealed a uterine polyp and presents to [facility] for scheduled D&C, Operative Hysteroscopy on 10/1/21. Pt. denies COVID-19 infection in , denies close contact with anyone that has been ill, no fever, cough, dyspnea in past two weeks. Pt.'s 2nd Moderna vaccine 2021    COVID-19 PCR NEGATIVE on 21 - result in chart

## 2021-09-29 NOTE — H&P PST ADULT - PROBLEM SELECTOR PLAN 1
D&C, non obstetrical operative Hysteroscopy on 10/1/121 D&C, non obstetrical operative Hysteroscopy on 10/1/21  Pre-op instructions provided - all questions answered  CBC and T&S done at [facility]  Uc DOS  Covid-19 negative on 9/28/21

## 2021-09-29 NOTE — H&P PST ADULT - NSICDXPASTSURGICALHX_GEN_ALL_CORE_FT
PAST SURGICAL HISTORY:  S/P hernia repair 2016 left inguinal    Surgery, elective 2012 [facility]     PAST SURGICAL HISTORY:  S/P hernia repair 2016 left inguinal    S/P myomectomy 2019    Surgery, elective 2012 [facility]

## 2021-09-30 ENCOUNTER — TRANSCRIPTION ENCOUNTER (OUTPATIENT)
Age: 46
End: 2021-09-30

## 2021-10-01 ENCOUNTER — OUTPATIENT (OUTPATIENT)
Dept: OUTPATIENT SERVICES | Facility: HOSPITAL | Age: 46
LOS: 1 days | End: 2021-10-01
Payer: COMMERCIAL

## 2021-10-01 ENCOUNTER — RESULT REVIEW (OUTPATIENT)
Age: 46
End: 2021-10-01

## 2021-10-01 VITALS
TEMPERATURE: 97 F | HEIGHT: 62 IN | SYSTOLIC BLOOD PRESSURE: 96 MMHG | RESPIRATION RATE: 16 BRPM | HEART RATE: 69 BPM | DIASTOLIC BLOOD PRESSURE: 66 MMHG | WEIGHT: 110.01 LBS | OXYGEN SATURATION: 99 %

## 2021-10-01 VITALS
RESPIRATION RATE: 18 BRPM | OXYGEN SATURATION: 100 % | TEMPERATURE: 98 F | HEART RATE: 61 BPM | DIASTOLIC BLOOD PRESSURE: 54 MMHG | SYSTOLIC BLOOD PRESSURE: 98 MMHG

## 2021-10-01 DIAGNOSIS — Z01.818 ENCOUNTER FOR OTHER PREPROCEDURAL EXAMINATION: ICD-10-CM

## 2021-10-01 DIAGNOSIS — D25.1 INTRAMURAL LEIOMYOMA OF UTERUS: ICD-10-CM

## 2021-10-01 DIAGNOSIS — Z98.890 OTHER SPECIFIED POSTPROCEDURAL STATES: Chronic | ICD-10-CM

## 2021-10-01 DIAGNOSIS — Z41.9 ENCOUNTER FOR PROCEDURE FOR PURPOSES OTHER THAN REMEDYING HEALTH STATE, UNSPECIFIED: Chronic | ICD-10-CM

## 2021-10-01 DIAGNOSIS — N84.0 POLYP OF CORPUS UTERI: ICD-10-CM

## 2021-10-01 LAB — RH IG SCN BLD-IMP: POSITIVE — SIGNIFICANT CHANGE UP

## 2021-10-01 PROCEDURE — 58558 HYSTEROSCOPY BIOPSY: CPT

## 2021-10-01 PROCEDURE — 88305 TISSUE EXAM BY PATHOLOGIST: CPT

## 2021-10-01 PROCEDURE — 88305 TISSUE EXAM BY PATHOLOGIST: CPT | Mod: 26

## 2021-10-01 RX ORDER — KETOROLAC TROMETHAMINE 30 MG/ML
15 SYRINGE (ML) INJECTION ONCE
Refills: 0 | Status: DISCONTINUED | OUTPATIENT
Start: 2021-10-01 | End: 2021-10-01

## 2021-10-01 RX ORDER — ONDANSETRON 8 MG/1
4 TABLET, FILM COATED ORAL ONCE
Refills: 0 | Status: DISCONTINUED | OUTPATIENT
Start: 2021-10-01 | End: 2021-10-15

## 2021-10-01 RX ORDER — SODIUM CHLORIDE 9 MG/ML
1000 INJECTION, SOLUTION INTRAVENOUS
Refills: 0 | Status: DISCONTINUED | OUTPATIENT
Start: 2021-10-01 | End: 2021-10-15

## 2021-10-01 NOTE — ASU DISCHARGE PLAN (ADULT/PEDIATRIC) - ASU DC SPECIAL INSTRUCTIONSFT
Discharge Instructions:  1. Diet: advance as tolerated  2. Activity limited by:   - no running, heavy lifting, strenuous exercise,   - nothing in vagina (bath, swim, intercourse, douching, tampons) for 2 weeks  3. Medications:   - Senna to prevent constipation (please hold for loose stools)  - Ibuprofen 600mg every 6 hours as needed for pain  - Acetaminophen 500mg every 4 hours as needed for pain  4. Follow-up appointment - 2 weeks. Please call the office upon discharge to schedule your appointment if you do not have one already.   5. Precautions:  - Call the office or go to the ED if you have any of the followin) Fever >100.4 that does not resolve  2) Intractable pain  3) Heavy bleeding

## 2021-10-01 NOTE — BRIEF OPERATIVE NOTE - OPERATION/FINDINGS
EUA  - normal external female genitalia, atrophic urethral meatus, normal appearing cervix  - well healed pfannenstiel skin incision  Hysteroscopy  - bilateral ostia visualized  - polypoid endometrium   - normal endocervical canal

## 2021-10-01 NOTE — ASU PATIENT PROFILE, ADULT - NSICDXPASTSURGICALHX_GEN_ALL_CORE_FT
PAST SURGICAL HISTORY:  S/P hernia repair 2016 left inguinal    S/P myomectomy 2019    Surgery, elective 2012 [facility]

## 2021-10-01 NOTE — ASU DISCHARGE PLAN (ADULT/PEDIATRIC) - CARE PROVIDER_API CALL
Sandro Saavedra)  Obstetrics and Gynecology  [address] [place] [address], [address]  [place] [address]  Phone: [phone]  Fax: [phone]  Follow Up Time: 2 weeks

## 2021-10-01 NOTE — BRIEF OPERATIVE NOTE - NSICDXBRIEFPROCEDURE_GEN_ALL_CORE_FT
PROCEDURES:  Diagnostic hysteroscopy with dilation and curettage of uterus 01-Oct-2021 13:17:10  Marbella Millard

## 2021-10-01 NOTE — ASU DISCHARGE PLAN (ADULT/PEDIATRIC) - NURSING INSTRUCTIONS
Tylenol/acetaminophen------AND/OR------Motrin/ibuprofen  as needed for pain/discomfort.   NEXT DOSE:  Tylenol  OK @  7:15pm this evening, if needed.  Follow up with MD as requested; call office for appointment.

## 2021-10-03 ENCOUNTER — FORM ENCOUNTER (OUTPATIENT)
Age: 46
End: 2021-10-03

## 2021-10-04 PROBLEM — D25.9 LEIOMYOMA OF UTERUS, UNSPECIFIED: Chronic | Status: ACTIVE | Noted: 2021-09-29

## 2021-10-08 LAB — SURGICAL PATHOLOGY STUDY: SIGNIFICANT CHANGE UP

## 2021-10-08 RX ORDER — NORGESTIMATE AND ETHINYL ESTRADIOL 7DAYSX3 28
KIT ORAL
Refills: 0 | Status: ACTIVE | COMMUNITY

## 2021-10-20 ENCOUNTER — APPOINTMENT (OUTPATIENT)
Dept: OBGYN | Facility: CLINIC | Age: 46
End: 2021-10-20

## 2021-12-16 ENCOUNTER — APPOINTMENT (OUTPATIENT)
Dept: OBGYN | Facility: CLINIC | Age: 46
End: 2021-12-16
Payer: COMMERCIAL

## 2021-12-16 VITALS
BODY MASS INDEX: 19.51 KG/M2 | SYSTOLIC BLOOD PRESSURE: 110 MMHG | HEIGHT: 62 IN | WEIGHT: 106 LBS | DIASTOLIC BLOOD PRESSURE: 70 MMHG

## 2021-12-16 DIAGNOSIS — N84.0 POLYP OF CORPUS UTERI: ICD-10-CM

## 2021-12-16 PROCEDURE — 99212 OFFICE O/P EST SF 10 MIN: CPT

## 2021-12-21 PROBLEM — N84.0 POLYP OF CORPUS UTERI: Status: ACTIVE | Noted: 2021-10-08

## 2021-12-21 NOTE — LETTER BODY
[Thank you very much for allowing me to participate in the care of this patient. If you have any questions, please do not hesitate to contact me] : Thank you very much for allowing me to participate in the care of this patient. If you have any questions, please do not hesitate to contact me.

## 2021-12-21 NOTE — REASON FOR VISIT
[Post Op Day: ___] : Post-Op Day:  #[unfilled] [Procedure: ___] : Procedure: [unfilled] [de-identified] : Pt doing well and is without complaints

## 2021-12-21 NOTE — HISTORY OF PRESENT ILLNESS
[Pain is well-controlled] : pain is well-controlled [de-identified] : 10/1/21 [de-identified] : Diagnostic hysteroscopy, D+C [de-identified] : Endometrial polyp  [de-identified] : Brief op note(10/1/21):\par \par  Operative Findings EUA\par - normal external female genitalia, atrophic urethral meatus, normal appearing\par cervix\par \par Hysteroscopy\par - bilateral ostia visualized\par - polypoid endometrium\par - normal endocervical canal\par \par \par Pathology (10/1/21): Polypoid fragments of benign endometrial tissue showing secretory change\par \par  [de-identified] : ABd soft nt/nd

## 2022-08-05 ENCOUNTER — APPOINTMENT (OUTPATIENT)
Dept: INTERNAL MEDICINE | Facility: CLINIC | Age: 47
End: 2022-08-05

## 2023-02-22 NOTE — ASU DISCHARGE PLAN (ADULT/PEDIATRIC) - DISCHARGE PLAN IS COMPLETE AND GIVEN TO PATIENT
Otolaryngologist Procedure Text (A): After obtaining clear surgical margins the patient was sent to otolaryngology for surgical repair.  The patient understands they will receive post-surgical care and follow-up from the referring physician's office. : Yes

## 2023-05-17 NOTE — PRE-ANESTHESIA EVALUATION ADULT - NSPROPOSEDPROCEDFT_GEN_ALL_CORE
Addended by: SHAISTA BURR on: 5/15/2023 12:00 PM     Modules accepted: Orders    
Addended by: SHAISTA BURR on: 5/17/2023 07:48 AM     Modules accepted: Orders    
D&C, operative hysteroscopy

## 2024-12-18 NOTE — PROGRESS NOTE ADULT - ASSESSMENT
Ep'ed xanax to pharm requested    Controlled Substance Monitoring:    Acute and Chronic Pain Monitoring:   RX Monitoring Periodic Controlled Substance Monitoring   12/18/2024   9:14 AM No signs of potential drug abuse or diversion identified.       Needs 6 month anxiety check up.  In person.     A/P: 43yo POD#2 s/p abdominal myomectomy, diagnostic hysteroscopy, and chromopertubation for a 22 week size uterus.    1. Neuro/Pain:  torodal, tylenol  2  CV:   VS per routine  3. Pulm: Encourage ISS  4. GI: Reg, nausea earlier which has now improved, passing flatus  5. :  voiding  6. Heme: hgb stable   7. ID: --  8. DVT ppx: SCDs  9. Dispo: when meeting all post-op milestones; either today 9/22 or tomorrow 9/23